# Patient Record
Sex: MALE | Race: WHITE | NOT HISPANIC OR LATINO | URBAN - METROPOLITAN AREA
[De-identification: names, ages, dates, MRNs, and addresses within clinical notes are randomized per-mention and may not be internally consistent; named-entity substitution may affect disease eponyms.]

---

## 2019-11-01 ENCOUNTER — APPOINTMENT (RX ONLY)
Dept: URBAN - METROPOLITAN AREA CLINIC 48 | Facility: CLINIC | Age: 64
Setting detail: DERMATOLOGY
End: 2019-11-01

## 2019-11-01 DIAGNOSIS — D18.0 HEMANGIOMA: ICD-10-CM

## 2019-11-01 DIAGNOSIS — L82.1 OTHER SEBORRHEIC KERATOSIS: ICD-10-CM

## 2019-11-01 DIAGNOSIS — D22 MELANOCYTIC NEVI: ICD-10-CM

## 2019-11-01 DIAGNOSIS — L57.8 OTHER SKIN CHANGES DUE TO CHRONIC EXPOSURE TO NONIONIZING RADIATION: ICD-10-CM

## 2019-11-01 DIAGNOSIS — L57.0 ACTINIC KERATOSIS: ICD-10-CM

## 2019-11-01 PROBLEM — D18.01 HEMANGIOMA OF SKIN AND SUBCUTANEOUS TISSUE: Status: ACTIVE | Noted: 2019-11-01

## 2019-11-01 PROBLEM — B20 HUMAN IMMUNODEFICIENCY VIRUS [HIV] DISEASE: Status: ACTIVE | Noted: 2019-11-01

## 2019-11-01 PROBLEM — D22.5 MELANOCYTIC NEVI OF TRUNK: Status: ACTIVE | Noted: 2019-11-01

## 2019-11-01 PROBLEM — I25.10 ATHEROSCLEROTIC HEART DISEASE OF NATIVE CORONARY ARTERY WITHOUT ANGINA PECTORIS: Status: ACTIVE | Noted: 2019-11-01

## 2019-11-01 PROCEDURE — ? COUNSELING

## 2019-11-01 PROCEDURE — 99203 OFFICE O/P NEW LOW 30 MIN: CPT | Mod: 25

## 2019-11-01 PROCEDURE — ? LIQUID NITROGEN

## 2019-11-01 PROCEDURE — 17004 DESTROY PREMAL LESIONS 15/>: CPT

## 2019-11-01 ASSESSMENT — LOCATION SIMPLE DESCRIPTION DERM
LOCATION SIMPLE: RIGHT CHEEK
LOCATION SIMPLE: LEFT CHEEK
LOCATION SIMPLE: LEFT FOREARM
LOCATION SIMPLE: POSTERIOR SCALP
LOCATION SIMPLE: LEFT EAR
LOCATION SIMPLE: RIGHT OCCIPITAL SCALP
LOCATION SIMPLE: RIGHT FOREHEAD
LOCATION SIMPLE: LEFT UPPER ARM
LOCATION SIMPLE: RIGHT SCALP
LOCATION SIMPLE: SCALP
LOCATION SIMPLE: RIGHT FOREARM
LOCATION SIMPLE: RIGHT EAR
LOCATION SIMPLE: CHEST
LOCATION SIMPLE: LEFT FOREHEAD
LOCATION SIMPLE: ABDOMEN

## 2019-11-01 ASSESSMENT — LOCATION DETAILED DESCRIPTION DERM
LOCATION DETAILED: LEFT INFERIOR CENTRAL MALAR CHEEK
LOCATION DETAILED: LEFT LATERAL SUPERIOR CHEST
LOCATION DETAILED: RIGHT SUPERIOR PARIETAL SCALP
LOCATION DETAILED: LEFT SUPERIOR FOREHEAD
LOCATION DETAILED: LEFT LATERAL ABDOMEN
LOCATION DETAILED: LEFT PROXIMAL RADIAL DORSAL FOREARM
LOCATION DETAILED: RIGHT SUPERIOR HELIX
LOCATION DETAILED: EPIGASTRIC SKIN
LOCATION DETAILED: RIGHT DISTAL DORSAL FOREARM
LOCATION DETAILED: LEFT INFERIOR LATERAL MALAR CHEEK
LOCATION DETAILED: RIGHT LATERAL MALAR CHEEK
LOCATION DETAILED: LEFT SUPERIOR PREAURICULAR CHEEK
LOCATION DETAILED: RIGHT CENTRAL PARIETAL SCALP
LOCATION DETAILED: PERIUMBILICAL SKIN
LOCATION DETAILED: LEFT PROXIMAL LATERAL POSTERIOR UPPER ARM
LOCATION DETAILED: RIGHT INFERIOR MEDIAL FOREHEAD
LOCATION DETAILED: RIGHT SUPERIOR LATERAL FOREHEAD
LOCATION DETAILED: LEFT SUPERIOR PARIETAL SCALP
LOCATION DETAILED: RIGHT CENTRAL FRONTAL SCALP
LOCATION DETAILED: LEFT TRAGUS
LOCATION DETAILED: LEFT MEDIAL SUPERIOR CHEST
LOCATION DETAILED: MID-OCCIPITAL SCALP
LOCATION DETAILED: RIGHT SUPERIOR OCCIPITAL SCALP

## 2019-11-01 ASSESSMENT — LOCATION ZONE DERM
LOCATION ZONE: TRUNK
LOCATION ZONE: SCALP
LOCATION ZONE: FACE
LOCATION ZONE: EAR
LOCATION ZONE: ARM

## 2019-11-01 NOTE — PROCEDURE: LIQUID NITROGEN
Consent: The patient's verbal consent was obtained including but not limited to risks of crusting, scabbing, blistering, scarring, darker or lighter pigmentary change, recurrence, incomplete removal and infection.
Render Post-Care Instructions In Note?: yes
Duration Of Freeze Thaw-Cycle (Seconds): 0
Detail Level: Simple
Number Of Freeze-Thaw Cycles: 2 freeze-thaw cycles
Post-Care Instructions: I reviewed with the patient in detail post-care instructions. Patient is to wear sunprotection, and avoid picking at any of the treated lesions.  Pt may apply Vaseline to crusted or scabbing areas and cover as needed
Render Note In Bullet Format When Appropriate: No

## 2019-11-01 NOTE — PROCEDURE: MIPS QUALITY
Additional Notes: Patient states on a scale of 0-10 pain level is 0.
Detail Level: Detailed
Quality 131: Pain Assessment And Follow-Up: Pain assessment using a standardized tool is documented as negative, no follow-up plan required
Quality 130: Documentation Of Current Medications In The Medical Record: Current Medications Documented

## 2024-05-01 ENCOUNTER — CONSULTATION/EVALUATION (OUTPATIENT)
Dept: URBAN - METROPOLITAN AREA CLINIC 39 | Facility: CLINIC | Age: 69
End: 2024-05-01

## 2024-05-01 DIAGNOSIS — H53.021: ICD-10-CM

## 2024-05-01 DIAGNOSIS — H34.8311: ICD-10-CM

## 2024-05-01 DIAGNOSIS — H25.813: ICD-10-CM

## 2024-05-01 DIAGNOSIS — H40.023: ICD-10-CM

## 2024-05-01 PROCEDURE — 92136 OPHTHALMIC BIOMETRY: CPT

## 2024-05-01 PROCEDURE — 92133 CPTRZD OPH DX IMG PST SGM ON: CPT

## 2024-05-01 PROCEDURE — 76514 ECHO EXAM OF EYE THICKNESS: CPT

## 2024-05-01 PROCEDURE — 92020 GONIOSCOPY: CPT

## 2024-05-01 PROCEDURE — 92025-3 CORNEAL TOPO, REFUSED

## 2024-05-01 PROCEDURE — 92134 CPTRZ OPH DX IMG PST SGM RTA: CPT

## 2024-05-01 PROCEDURE — 92004 COMPRE OPH EXAM NEW PT 1/>: CPT

## 2024-05-01 RX ORDER — PREDNISOLONE ACETATE 10 MG/ML: 1 SUSPENSION/ DROPS OPHTHALMIC

## 2024-05-01 RX ORDER — NEPAFENAC 3 MG/ML: 1 SUSPENSION/ DROPS OPHTHALMIC ONCE A DAY

## 2024-05-01 RX ORDER — MOXIFLOXACIN OPHTHALMIC 5 MG/ML: 1 SOLUTION/ DROPS OPHTHALMIC

## 2024-05-01 ASSESSMENT — TONOMETRY
OS_IOP_MMHG: 12
OD_IOP_MMHG: 12

## 2024-05-01 ASSESSMENT — PACHYMETRY
OD_CT_UM: 608
OS_CT_UM: 601

## 2024-05-01 ASSESSMENT — VISUAL ACUITY
OS_AM: 20/25
OD_CC: J10
OS_BAT: 20/60
OS_SC: CF 6FT
OS_CC: J3
OS_CC: 20/30-1
OD_CC: 20/60

## 2024-07-08 ENCOUNTER — SURGERY/PROCEDURE (OUTPATIENT)
Facility: LOCATION | Age: 69
End: 2024-07-08

## 2024-07-08 ENCOUNTER — POST-OP (OUTPATIENT)
Dept: URBAN - METROPOLITAN AREA CLINIC 39 | Facility: CLINIC | Age: 69
End: 2024-07-08

## 2024-07-08 ENCOUNTER — PRE-OP/H&P (OUTPATIENT)
Facility: LOCATION | Age: 69
End: 2024-07-08

## 2024-07-08 DIAGNOSIS — H40.1111: ICD-10-CM

## 2024-07-08 DIAGNOSIS — H34.8311: ICD-10-CM

## 2024-07-08 DIAGNOSIS — H25.813: ICD-10-CM

## 2024-07-08 DIAGNOSIS — H25.812: ICD-10-CM

## 2024-07-08 DIAGNOSIS — H40.1131: ICD-10-CM

## 2024-07-08 DIAGNOSIS — H25.811: ICD-10-CM

## 2024-07-08 DIAGNOSIS — H53.021: ICD-10-CM

## 2024-07-08 DIAGNOSIS — Z96.1: ICD-10-CM

## 2024-07-08 PROCEDURE — 66174 TRLUML DIL AQ O/F CAN W/O ST: CPT

## 2024-07-08 PROCEDURE — 99211HP PRE-OP

## 2024-07-08 PROCEDURE — 99024 POSTOP FOLLOW-UP VISIT: CPT

## 2024-07-08 PROCEDURE — 66991 XCAPSL CTRC RMVL INSJ 1+: CPT

## 2024-07-08 ASSESSMENT — VISUAL ACUITY: OD_SC: 20/70

## 2024-07-08 ASSESSMENT — TONOMETRY: OD_IOP_MMHG: 15

## 2024-07-15 ENCOUNTER — POST OP/EVAL OF SECOND EYE (OUTPATIENT)
Dept: URBAN - METROPOLITAN AREA CLINIC 39 | Facility: CLINIC | Age: 69
End: 2024-07-15

## 2024-07-15 ENCOUNTER — SURGERY/PROCEDURE (OUTPATIENT)
Facility: LOCATION | Age: 69
End: 2024-07-15

## 2024-07-15 DIAGNOSIS — H53.021: ICD-10-CM

## 2024-07-15 DIAGNOSIS — H40.1121: ICD-10-CM

## 2024-07-15 DIAGNOSIS — H34.8311: ICD-10-CM

## 2024-07-15 DIAGNOSIS — H25.812: ICD-10-CM

## 2024-07-15 DIAGNOSIS — H40.1131: ICD-10-CM

## 2024-07-15 DIAGNOSIS — Z96.1: ICD-10-CM

## 2024-07-15 PROCEDURE — 66174 TRLUML DIL AQ O/F CAN W/O ST: CPT | Mod: 79,LT

## 2024-07-15 PROCEDURE — 66991 XCAPSL CTRC RMVL INSJ 1+: CPT | Mod: 79,LT

## 2024-07-15 PROCEDURE — 99024 POSTOP FOLLOW-UP VISIT: CPT

## 2024-07-15 ASSESSMENT — VISUAL ACUITY
OS_SC: >J12
OD_SC: >J12
OS_CC: 20/30-2
OD_SC: 20/70

## 2024-07-15 ASSESSMENT — TONOMETRY
OS_IOP_MMHG: 13
OD_IOP_MMHG: 12

## 2024-09-05 ENCOUNTER — CONSULTATION/EVALUATION (OUTPATIENT)
Dept: URBAN - METROPOLITAN AREA CLINIC 43 | Facility: CLINIC | Age: 69
End: 2024-09-05

## 2024-09-05 DIAGNOSIS — H26.493: ICD-10-CM

## 2024-09-05 DIAGNOSIS — H40.1131: ICD-10-CM

## 2024-09-05 DIAGNOSIS — Z96.1: ICD-10-CM

## 2024-09-05 PROCEDURE — 92014 COMPRE OPH EXAM EST PT 1/>: CPT

## 2024-09-05 RX ORDER — PREDNISOLONE ACETATE 10 MG/ML: 1 SUSPENSION/ DROPS OPHTHALMIC

## 2024-09-05 RX ORDER — NEPAFENAC 3 MG/ML: 1 SUSPENSION/ DROPS OPHTHALMIC ONCE A DAY

## 2024-10-15 ENCOUNTER — FOLLOW UP (OUTPATIENT)
Dept: URBAN - METROPOLITAN AREA CLINIC 43 | Facility: CLINIC | Age: 69
End: 2024-10-15

## 2024-10-15 DIAGNOSIS — H35.353: ICD-10-CM

## 2024-10-15 DIAGNOSIS — H26.493: ICD-10-CM

## 2024-10-15 DIAGNOSIS — Z96.1: ICD-10-CM

## 2024-10-15 PROCEDURE — 92012 INTRM OPH EXAM EST PATIENT: CPT

## 2024-10-15 PROCEDURE — 92134 CPTRZ OPH DX IMG PST SGM RTA: CPT

## 2024-10-18 ENCOUNTER — CONSULTATION/EVALUATION (OUTPATIENT)
Dept: URBAN - METROPOLITAN AREA CLINIC 43 | Facility: CLINIC | Age: 69
End: 2024-10-18

## 2024-10-18 DIAGNOSIS — H35.033: ICD-10-CM

## 2024-10-18 DIAGNOSIS — H26.493: ICD-10-CM

## 2024-10-18 DIAGNOSIS — H40.1131: ICD-10-CM

## 2024-10-18 DIAGNOSIS — H34.8311: ICD-10-CM

## 2024-10-18 DIAGNOSIS — H35.353: ICD-10-CM

## 2024-10-18 DIAGNOSIS — H35.09: ICD-10-CM

## 2024-10-18 PROCEDURE — 67515 INJECT/TREAT EYE SOCKET: CPT

## 2024-10-18 PROCEDURE — 99214 OFFICE O/P EST MOD 30 MIN: CPT | Mod: 24,25

## 2024-10-18 PROCEDURE — 92250 FUNDUS PHOTOGRAPHY W/I&R: CPT

## 2024-10-18 PROCEDURE — 92273 FULL FIELD ERG W/I&R: CPT

## 2024-11-06 ENCOUNTER — FOLLOW UP (OUTPATIENT)
Dept: URBAN - METROPOLITAN AREA CLINIC 43 | Facility: CLINIC | Age: 69
End: 2024-11-06

## 2024-11-06 DIAGNOSIS — H35.353: ICD-10-CM

## 2024-11-06 DIAGNOSIS — H40.1131: ICD-10-CM

## 2024-11-06 DIAGNOSIS — H35.09: ICD-10-CM

## 2024-11-06 DIAGNOSIS — H35.033: ICD-10-CM

## 2024-11-06 DIAGNOSIS — H35.363: ICD-10-CM

## 2024-11-06 DIAGNOSIS — H34.8310: ICD-10-CM

## 2024-11-06 PROCEDURE — 99214 OFFICE O/P EST MOD 30 MIN: CPT | Mod: 25

## 2024-11-06 PROCEDURE — G9903 PT SCRN TBCO ID AS NON USER: HCPCS

## 2024-11-06 PROCEDURE — J3490AVA AVASTIN *: Mod: JZ,RT

## 2024-11-06 PROCEDURE — 1036F TOBACCO NON-USER: CPT

## 2024-11-06 PROCEDURE — 67028 INJECTION EYE DRUG: CPT

## 2024-11-06 PROCEDURE — 92250 FUNDUS PHOTOGRAPHY W/I&R: CPT

## 2024-11-25 ENCOUNTER — FOLLOW UP (OUTPATIENT)
Dept: URBAN - METROPOLITAN AREA CLINIC 43 | Facility: CLINIC | Age: 69
End: 2024-11-25

## 2024-11-25 DIAGNOSIS — H34.8310: ICD-10-CM

## 2024-11-25 DIAGNOSIS — H35.033: ICD-10-CM

## 2024-11-25 DIAGNOSIS — H40.1131: ICD-10-CM

## 2024-11-25 DIAGNOSIS — H35.09: ICD-10-CM

## 2024-11-25 DIAGNOSIS — H04.123: ICD-10-CM

## 2024-11-25 DIAGNOSIS — H35.363: ICD-10-CM

## 2024-11-25 DIAGNOSIS — H35.353: ICD-10-CM

## 2024-11-25 PROCEDURE — 92250 FUNDUS PHOTOGRAPHY W/I&R: CPT

## 2024-11-25 PROCEDURE — 99213 OFFICE O/P EST LOW 20 MIN: CPT

## 2024-11-25 RX ORDER — PREDNISOLONE ACETATE 10 MG/ML
1 SUSPENSION/ DROPS OPHTHALMIC
Start: 2024-11-25

## 2024-11-25 RX ORDER — KETOROLAC TROMETHAMINE 5 MG/ML
1 SOLUTION OPHTHALMIC
Start: 2024-11-25

## 2025-01-06 ENCOUNTER — FOLLOW UP (OUTPATIENT)
Age: 70
End: 2025-01-06

## 2025-01-06 DIAGNOSIS — H04.123: ICD-10-CM

## 2025-01-06 DIAGNOSIS — H35.09: ICD-10-CM

## 2025-01-06 DIAGNOSIS — H40.1131: ICD-10-CM

## 2025-01-06 DIAGNOSIS — H35.363: ICD-10-CM

## 2025-01-06 DIAGNOSIS — H34.8310: ICD-10-CM

## 2025-01-06 DIAGNOSIS — H35.353: ICD-10-CM

## 2025-01-06 DIAGNOSIS — H35.033: ICD-10-CM

## 2025-01-06 PROCEDURE — 99213 OFFICE O/P EST LOW 20 MIN: CPT

## 2025-01-06 PROCEDURE — 92250 FUNDUS PHOTOGRAPHY W/I&R: CPT

## 2025-02-28 ENCOUNTER — FOLLOW UP (OUTPATIENT)
Age: 70
End: 2025-02-28

## 2025-02-28 DIAGNOSIS — H35.353: ICD-10-CM

## 2025-02-28 DIAGNOSIS — H35.363: ICD-10-CM

## 2025-02-28 DIAGNOSIS — H52.7: ICD-10-CM

## 2025-02-28 DIAGNOSIS — H34.8310: ICD-10-CM

## 2025-02-28 DIAGNOSIS — H40.1131: ICD-10-CM

## 2025-02-28 DIAGNOSIS — H35.033: ICD-10-CM

## 2025-02-28 DIAGNOSIS — H35.09: ICD-10-CM

## 2025-02-28 DIAGNOSIS — H04.123: ICD-10-CM

## 2025-02-28 PROCEDURE — 92134 CPTRZ OPH DX IMG PST SGM RTA: CPT

## 2025-02-28 PROCEDURE — 99213 OFFICE O/P EST LOW 20 MIN: CPT

## 2025-03-28 ENCOUNTER — COMPREHENSIVE EXAM (OUTPATIENT)
Age: 70
End: 2025-03-28

## 2025-03-28 DIAGNOSIS — H04.123: ICD-10-CM

## 2025-03-28 DIAGNOSIS — H40.1131: ICD-10-CM

## 2025-03-28 DIAGNOSIS — H34.8310: ICD-10-CM

## 2025-03-28 DIAGNOSIS — H35.033: ICD-10-CM

## 2025-03-28 DIAGNOSIS — H35.353: ICD-10-CM

## 2025-03-28 DIAGNOSIS — H35.09: ICD-10-CM

## 2025-03-28 DIAGNOSIS — H35.363: ICD-10-CM

## 2025-03-28 PROCEDURE — 92134 CPTRZ OPH DX IMG PST SGM RTA: CPT

## 2025-03-28 PROCEDURE — 99213 OFFICE O/P EST LOW 20 MIN: CPT

## 2025-03-28 PROCEDURE — 92273 FULL FIELD ERG W/I&R: CPT

## 2025-03-28 RX ORDER — PREDNISOLONE ACETATE 10 MG/ML: 1 SUSPENSION/ DROPS OPHTHALMIC

## 2025-03-28 RX ORDER — KETOROLAC TROMETHAMINE 5 MG/ML: 1 SOLUTION OPHTHALMIC

## 2025-05-30 ENCOUNTER — COMPREHENSIVE EXAM (OUTPATIENT)
Age: 70
End: 2025-05-30

## 2025-05-30 DIAGNOSIS — H34.8310: ICD-10-CM

## 2025-05-30 DIAGNOSIS — H40.1131: ICD-10-CM

## 2025-05-30 DIAGNOSIS — H35.033: ICD-10-CM

## 2025-05-30 DIAGNOSIS — H04.123: ICD-10-CM

## 2025-05-30 DIAGNOSIS — H35.09: ICD-10-CM

## 2025-05-30 DIAGNOSIS — H35.363: ICD-10-CM

## 2025-05-30 DIAGNOSIS — H35.353: ICD-10-CM

## 2025-05-30 PROCEDURE — 92250 FUNDUS PHOTOGRAPHY W/I&R: CPT

## 2025-05-30 PROCEDURE — 99213 OFFICE O/P EST LOW 20 MIN: CPT

## 2025-05-30 RX ORDER — KETOROLAC TROMETHAMINE 5 MG/ML
1 SOLUTION OPHTHALMIC
Start: 2025-05-30

## 2025-05-30 RX ORDER — PREDNISOLONE ACETATE 10 MG/ML
1 SUSPENSION/ DROPS OPHTHALMIC
Start: 2025-05-30

## 2025-07-01 ENCOUNTER — COMPREHENSIVE EXAM (OUTPATIENT)
Age: 70
End: 2025-07-01

## 2025-07-01 DIAGNOSIS — H40.1131: ICD-10-CM

## 2025-07-01 DIAGNOSIS — H35.033: ICD-10-CM

## 2025-07-01 DIAGNOSIS — H35.353: ICD-10-CM

## 2025-07-01 DIAGNOSIS — H04.123: ICD-10-CM

## 2025-07-01 DIAGNOSIS — H34.8310: ICD-10-CM

## 2025-07-01 PROCEDURE — 99213 OFFICE O/P EST LOW 20 MIN: CPT

## 2025-07-01 PROCEDURE — 92134 CPTRZ OPH DX IMG PST SGM RTA: CPT

## 2025-08-22 ENCOUNTER — COMPREHENSIVE EXAM (OUTPATIENT)
Age: 70
End: 2025-08-22

## 2025-08-22 DIAGNOSIS — H35.09: ICD-10-CM

## 2025-08-22 DIAGNOSIS — H35.033: ICD-10-CM

## 2025-08-22 DIAGNOSIS — H40.1131: ICD-10-CM

## 2025-08-22 DIAGNOSIS — H35.363: ICD-10-CM

## 2025-08-22 DIAGNOSIS — H34.8310: ICD-10-CM

## 2025-08-22 DIAGNOSIS — H04.123: ICD-10-CM

## 2025-08-22 DIAGNOSIS — H35.353: ICD-10-CM

## 2025-08-22 PROCEDURE — 99213 OFFICE O/P EST LOW 20 MIN: CPT

## 2025-08-22 PROCEDURE — 92134 CPTRZ OPH DX IMG PST SGM RTA: CPT
